# Patient Record
Sex: MALE | Race: ASIAN | NOT HISPANIC OR LATINO
[De-identification: names, ages, dates, MRNs, and addresses within clinical notes are randomized per-mention and may not be internally consistent; named-entity substitution may affect disease eponyms.]

---

## 2024-09-03 PROBLEM — Z00.00 ENCOUNTER FOR PREVENTIVE HEALTH EXAMINATION: Status: ACTIVE | Noted: 2024-09-03

## 2024-09-04 ENCOUNTER — LABORATORY RESULT (OUTPATIENT)
Age: 44
End: 2024-09-04

## 2024-09-04 ENCOUNTER — APPOINTMENT (OUTPATIENT)
Dept: TRANSPLANT | Facility: CLINIC | Age: 44
End: 2024-09-04

## 2024-09-04 ENCOUNTER — APPOINTMENT (OUTPATIENT)
Dept: TRANSPLANT | Facility: CLINIC | Age: 44
End: 2024-09-04
Payer: COMMERCIAL

## 2024-09-04 ENCOUNTER — NON-APPOINTMENT (OUTPATIENT)
Age: 44
End: 2024-09-04

## 2024-09-04 ENCOUNTER — APPOINTMENT (OUTPATIENT)
Dept: HEPATOLOGY | Facility: CLINIC | Age: 44
End: 2024-09-04
Payer: COMMERCIAL

## 2024-09-04 VITALS
WEIGHT: 130 LBS | OXYGEN SATURATION: 100 % | BODY MASS INDEX: 21.66 KG/M2 | HEART RATE: 69 BPM | SYSTOLIC BLOOD PRESSURE: 118 MMHG | RESPIRATION RATE: 16 BRPM | HEIGHT: 65 IN | DIASTOLIC BLOOD PRESSURE: 75 MMHG | TEMPERATURE: 98.1 F

## 2024-09-04 DIAGNOSIS — C22.0 LIVER CELL CARCINOMA: ICD-10-CM

## 2024-09-04 DIAGNOSIS — B18.1 CHRONIC VIRAL HEPATITIS B W/OUT DELTA-AGENT: ICD-10-CM

## 2024-09-04 DIAGNOSIS — Z01.818 ENCOUNTER FOR OTHER PREPROCEDURAL EXAMINATION: ICD-10-CM

## 2024-09-04 DIAGNOSIS — Z01.810 ENCOUNTER FOR PREPROCEDURAL CARDIOVASCULAR EXAMINATION: ICD-10-CM

## 2024-09-04 DIAGNOSIS — K76.9 LIVER DISEASE, UNSPECIFIED: ICD-10-CM

## 2024-09-04 PROCEDURE — 99205 OFFICE O/P NEW HI 60 MIN: CPT

## 2024-09-04 PROCEDURE — 99215 OFFICE O/P EST HI 40 MIN: CPT

## 2024-09-04 NOTE — ASSESSMENT
[FreeTextEntry1] : Patient came in for Initial liver transplant evaluation. Pt met with members of the team: Transplant Surgeon Dr. Dagher, Hepatologist , Transplant Coordinator, , Dietician, Pharmacist,  and ASA. Liver Transplant Education provided via Power Point presentation, overview given of transplant evaluation process, risks/benefits of transplantation, live donor/ donor transplantation,PHS risk/DCD/HCV organ sources, Waitlist management, post transplant care/medication/clinic follow-up. Provided and reviewed educational packet.  All questions answered and team contact information provided. Medications reviewed and updated. Patient reviewed and signed: Informed consent for liver transplant evaluation, HIPPA, [employer], email and HIV form. Patient was advised to inform the transplant coordinator with any changes in health status.  HCV donor acceptance consent - DECLIINED  Required testing/labs reviewed with pt.  Once said testing has been completed, Transplant Coordinator will present to the selection committee  Assessment and Plan:   Pt is 45yo M with HBV newly diagnosed on entecavir, and multifocal HCC (within [place]) here for transplant evaluation.  Pt consented to liver txp evaluation.  The following testing is needed:   Liver Evaluation Labs, ordered HCC surveillance - performed in July and Aug, 2024 - obtain CD of imaging - CTAP w/wo IVC 24 and MRI Abdomen 2024 Present to Tumor board HCC, Chest CT UTD 2024 - no thoracic mets Smoking status, current, Chest CT done, encouraged smoking cessation Cardiac, Risk Factors, age, needs DSE, refer to cards Dr. Castañeda EGD - never done, consider scd with Dr. Valenzuela (pt-preference) COLON - never done, age 44, no fam hx colon ca, consider scd with Dr. Valenzuela (pt-preference) Infectious Disease consult- refer to Dr. Hood HEME/Other - obtain Y90 records (tbs at [facility]) SW recommendations RD recommendations Dental, not needed Donor option, none at this time.

## 2024-09-04 NOTE — HISTORY OF PRESENT ILLNESS
[Hepatitis B Virus] : Hepatitis B Virus [History of HCC] : History of hepatocellular carcinoma [Working] : Working [90: Able to carry normal activity; minor signs or symptoms of disease.] : 90: Able to carry normal activity; minor signs or symptoms of disease.  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 There is also a 7 mm indeterminate lesion in seg 8.  Pt case presented case at [facility] multidisciplinary hepatobiliary conference and pt was referred to IR for y90. No extrathoracic mets per Chest CT.  No Cirrhosis or portal htn. MELD?  [ABO  ?]  BMI 22  Pt accompanied by Brother  BACKGROUND does not know how he contracted HBV, family does not have it How long HBV - noted 2 mos ago, started treatment 8/22/24  [place] 5[place]29/24 after being sponsored by his wife.  During a green card application medical checkup, was found to have elevated liver enzymes.  An MRI Abd performed 7/2/2024 revealed 4 cm heterogeneous enhancing mass in seg 8 highly suspicious for hepatocellular carcinoma (LR 5), 1.2 cm vascular lesion in seg 6 medially somewhat s/f hepatocellular carcinoma (LR 4), 7 mm subcapsular arterial phase enhancing focus in seg 8 which is indeterminate, with vascular perfusion anomaly favored.  ALL NKDA  MEDICATION entecavir  SURG HX None  FAM HX No HBV, liver disease, liver or gastric cancer  SOCIAL HX from 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   In the medial aspect of seg 6 in the R lobe there is a 1.3 cm arterially enhancing lesion on the arterial phase, 4:36. This is not well visualized on the venous phase.   At least 2 subcapsular arterially enhancing foci are demonstrated in segment 8 and 8/5, best demonstrated on image 4:22. This corresponds to the MRI, image 6:17. The larger lesion measures approximately 1.7 cm on the arterial phase. A probable third subcapsular focus is demonstrated on image 4:24. These are not well visualized on the venous phase imaging.   The portal vein is patent and appears normal in caliber.  Gallbladder: Unremarkable.  Bile ducts: No evidence of biliary dilatation  Kidneys/Ureters/Urinary Bladder: The kidneys are normal in size and demonstrate symmetric enhancement. Bilateral tiny hypodense foci are too small to categorize but statistically likely cysts. No hydronephrosis is demonstrated. A tiny nonobstructive right renal lower pole calculus is demonstrated. The bladder is grossly unremarkable.  Spleen: The spleen is normal in size and contour.  Pancreas: Unremarkable.  Adrenal glands: No evidence of adrenal nodule.  Retroperitoneum: Small retroperitoneal lymph nodes are demonstrated, largest short axis 7 mm in a left periaortic location.  Lymph nodes: Small gastrohepatic lymph nodes are demonstrated, maximal short axis dimension 7.5 mm, image 6:25.  Peritoneum: No evidence of ascites  Bowel/Stomach: The bowel is not optimally distended for complete evaluation. No evidence of bowel obstruction.  Appendix: Unremarkable.  Pelvic Organs: The prostate gland is not enlarged. Seminal vesicles are unremarkable. No pelvic fluid is seen.  Musculoskeletal: No destructive lesions are demonstrated. Vascular Structures: No evidence of abdominal aortic aneurysm   IMPRESSION Multiple hepatic lesions with arterial enhancement as described above, the largest in the right lobe measuring 4.5 cm.  Additional lesions, approximately 4-5, are again demonstrated as described above.  Small gastrohepatic and retroperitoneal lymph nodes as described above.  No evidence of ascites.  MRI Abdomen 7/2/2024:  - 4 cm heterogeneous enhancing mass in segment 8 of the liver which is highly suspicious for hepatocellular carcinoma (LR 5) - 1.2 cm vascular lesion in segment 6 of the liver medially which is somewhat suspicious for hepatocellular carcinoma (LR 4) - 7 mm subcapsular arterial phase enhancing focus in segment 8 which is indeterminate, with vascular perfusion anomaly favored.  CT CHEST IVC  8/29/24  FINDINGS:  LUNGS: Unremarkable  PLEURA: No Effusion  MEDIASTINUM: No enlarged thoracic lymph nodes.  CARDIAC: Normal cardiac size  BONE/ SOFT TISSUE: Unremarkable  UPPER ABDOMEN: The upper abdomen is being evaluated with the entire abdominal CT of 8/29/2024.   IMPRESSION Normal CT of the chest  EGD - Never had  COLON - never had, age 44, no fam hx colon ca  INTERVAL HX feels 'normal' denies abdl pain, NVD, CP, SOB, f/c appetite 'good' BM 1x/daily, no rectal blood denies BRITT, ascites, denies hematemesis, denies confusion/trouble sleeping

## 2024-09-04 NOTE — PLAN
[Patient agrees to proceed with the full evaluation for liver transplantation.] : Patient agrees to proceed with the full evaluation for liver transplantation. [FreeTextEntry1] : 44 year old gentleman with HBV and recently found to have HCC, likely multifocal.  Smokes about half pack a day but otherwise in good health.  Imaging reviewed - right lobe lesion with at least two other smaller lesions.  Given age and multifocal nature of his HCC, I think transplant is best chance for long term cure.  Planned for Y90 at [facility] in the meantime.  Will also review imaging at our tumor conference.  Encouraged him to cut back/quit smoking.  He and his brother understand the plan.  Good to see him.

## 2024-09-04 NOTE — END OF VISIT
[FreeTextEntry3] : See narrative above.  [Time Spent: ___ minutes] : I have spent [unfilled] minutes of time on the encounter which excludes teaching and separately reported services. [Attestation] : The patient was explained alternatives, benefits and risk of liver transplantation, including but not limited to infection, bleeding, hepatic artery or portal vein thrombosis, primary dysfunction or primary non-function of the liver allograft, cardiopulmonary arrest, intra-operative death and other surgical, medical and psychosocial risks as outlined in the evaluation consent form.  The patient understands these risks and is willing to proceed with liver transplantation. The patient was also explained the need to remain on lifelong anti-rejection medications.  We discussed the risks and side effects of immunosuppressive medications including, but not limited to infection, cancer, weight gain, new onset or worsening of diabetes or hypertension in a temporary or permanent state, kidney dysfunction, water retention, back pain, constipation, diarrhea, dizziness, headache, joint pain, loss of appetite, nausea, stomach pain or upset, trouble sleeping, vomiting, and mental or mood changes.  An overview of the follow-up protocol was reviewed including outpatient visits, blood tests and the potential for hospital readmission.  The patient understands these risks and is willing to proceed with liver transplantation. We also discussed the available donor organ pool. We discussed the assessment of the  donor including age, cause of death, cardiac arrest, electrolyte abnormalities, course and length of hospital stay, use of vasopressors, hepatitis and HIV testing. We reviewed organ donor risk factors that could affect the success of the graft or the health of the patient, including, but not limited to, the donor's history, condition or age of the organs used, or the patient's potential risk of judith the human immunodeficiency virus and other infectious diseases if the disease cannot be detected in an infected donor.  We discussed and defined the option of an extended criteria for cadaveric donors (Hepatitis B core Ab positive donor, Hepatitis C Ab positive donor, steatosis, older donors, split livers and DCD donors) and early and late outcomes of graft survival after transplantation. The options of  donor liver transplantation vs. live donor liver transplantation were discussed with the patient.  Differences between donation after cardiac death (DCD) compared to donation after brain death (DBD) liver transplantation were also fully disclosed and included lower graft survival rates, the increased incidence of hepatic artery stenosis, bile duct injury, ischemic cholangiopathy and increased re transplant rates seen in recipients of DCD donor livers. The use of the U.S. Public Health Services (PHS) Guideline has defined some donors as "Increased Risk Donors" based on their history which may suggest socially increased risk behaviors was discussed. The patient is aware that if PHS increased risk donor is offered to the candidate, the transplant team will discuss the specifics to assist with making an informed decision. We discussed our post-transplant protocol of serology testing if the candidate receives an organ that is PHS increased risk. The patient was made aware that it is against the law to be paid or to pay to donate an organ.  If any money was given or will be given in exchange for receiving an organ, the patient may be subject to criminal prosecution, and any insurance coverage may no longer apply and patient may become personally responsible for all the health care costs associated with the donation, and private health information will be available to law enforcement agencies. We explained that we store vessels for subsequent later use in transplants.  Again, we discussed the extensive testing done on  donors prior to donation, however despite an extensive evaluation on the donor, there is potential risk a recipient may contract infectious diseases (HIV or Hepatitis) or cancer if they cannot be detected in the donor. In the cases where PHS Increased Risk donor vessels are used, we test the recipient per protocol post-transplant for any potential infectious disease transmission. The patient understands that there is the potential of use of  donor vessels and PHS increased risk donor vessels. The patient understands these risks and is willing to receive potentially PHS increased risk donor vessels. We also discussed the MELD allocation system in depth and the one-year observed and expected patient and graft survival rates according to latest data from the Scientific Registry for Transplant Recipients. These center specific outcomes were provided in comparison to the national one-year averages as described in the evaluation consent forms. Prior to signing consent, the patient was given an opportunity to ask questions.  After all concerns were addressed, informed consent was signed. Patient is aware that they may withdraw their consent for transplantation at any time.  Further, the patient is aware of the right to refuse an organ offer without penalty at any time.

## 2024-09-04 NOTE — HISTORY OF PRESENT ILLNESS
[Hepatitis B Virus] : Hepatitis B Virus [History of HCC] : History of hepatocellular carcinoma [Working] : Working [90: Able to carry normal activity; minor signs or symptoms of disease.] : 90: Able to carry normal activity; minor signs or symptoms of disease.  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 There is also a 7 mm indeterminate lesion in seg 8.  Pt case presented case at [facility] multidisciplinary hepatobiliary conference and pt was referred to IR for y90. No extrathoracic mets per Chest CT.  No Cirrhosis or portal htn. MELD?  [ABO  ?]  BMI 22  Pt accompanied by Brother  BACKGROUND does not know how he contracted HBV, family does not have it How long HBV - noted 2 mos ago, started treatment 8/22/24  [place] after being sponsored by his wife.  During a green card application medical checkup, was found to have elevated liver enzymes.  An MRI Abd performed 7/2/2024 revealed 4 cm heterogeneous enhancing mass in seg 8 highly suspicious for hepatocellular carcinoma (LR 5), 1.2 cm vascular lesion in seg 6 medially somewhat s/f hepatocellular carcinoma (LR 4), 7 mm subcapsular arterial phase enhancing focus in seg 8 which is indeterminate, with vascular perfusion anomaly favored.  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   In the medial aspect of seg 6 in the R lobe there is a 1.3 cm arterially enhancing lesion on the arterial phase, 4:36. This is not well visualized on the venous phase.   At least 2 subcapsular arterially enhancing foci are demonstrated in segment 8 and 8/5, best demonstrated on image 4:22. This corresponds to the MRI, image 6:17. The larger lesion measures approximately 1.7 cm on the arterial phase. A probable third subcapsular focus is demonstrated on image 4:24. These are not well visualized on the venous phase imaging.   The portal vein is patent and appears normal in caliber.  Gallbladder: Unremarkable.  Bile ducts: No evidence of biliary dilatation  Kidneys/Ureters/Urinary Bladder: The kidneys are normal in size and demonstrate symmetric enhancement. Bilateral tiny hypodense foci are too small to categorize but statistically likely cysts. No hydronephrosis is demonstrated. A tiny nonobstructive right renal lower pole calculus is demonstrated. The bladder is grossly unremarkable.  Spleen: The spleen is normal in size and contour.  Pancreas: Unremarkable.  Adrenal glands: No evidence of adrenal nodule.  Retroperitoneum: Small retroperitoneal lymph nodes are demonstrated, largest short axis 7 mm in a left periaortic location.  Lymph nodes: Small gastrohepatic lymph nodes are demonstrated, maximal short axis dimension 7.5 mm, image 6:25.  Peritoneum: No evidence of ascites  Bowel/Stomach: The bowel is not optimally distended for complete evaluation. No evidence of bowel obstruction.  Appendix: Unremarkable.  Pelvic Organs: The prostate gland is not enlarged. Seminal vesicles are unremarkable. No pelvic fluid is seen.  Musculoskeletal: No destructive lesions are demonstrated. Vascular Structures: No evidence of abdominal aortic aneurysm   IMPRESSION Multiple hepatic lesions with arterial enhancement as described above, the largest in the right lobe measuring 4.5 cm.  Additional lesions, approximately 4-5, are again demonstrated as described above.  Small gastrohepatic and retroperitoneal lymph nodes as described above.  No evidence of ascites.  MRI Abdomen 7/2/2024:  - 4 cm heterogeneous enhancing mass in segment 8 of the liver which is highly suspicious for hepatocellular carcinoma (LR 5) - 1.2 cm vascular lesion in segment 6 of the liver medially which is somewhat suspicious for hepatocellular carcinoma (LR 4) - 7 mm subcapsular arterial phase enhancing focus in segment 8 which is indeterminate, with vascular perfusion anomaly favored.  CT CHEST IVC  8/29/24  FINDINGS:  LUNGS: Unremarkable  PLEURA: No Effusion  MEDIASTINUM: No enlarged thoracic lymph nodes.  CARDIAC: Normal cardiac size  BONE/ SOFT TISSUE: Unremarkable  UPPER ABDOMEN: The upper abdomen is being evaluated with the entire abdominal CT of 8/29/2024.   IMPRESSION Normal CT of the chest  EGD - Never had  COLON - never had, age 44, no fam hx colon ca  INTERVAL HX feels 'normal' denies abdl pain, NVD, CP, SOB, f/c appetite 'good' BM 1x/daily, no rectal blood denies BRITT, ascites, denies hematemesis, denies confusion/trouble sleeping

## 2024-09-04 NOTE — REASON FOR VISIT
[Initial] : an initial visit for [Liver Transplant Evaluation] : liver transplant evaluation [FreeTextEntry2] : Dr. Fernando Valenzuela

## 2024-09-04 NOTE — REASON FOR VISIT
[Initial] : an initial visit for [Liver Transplant Evaluation] : liver transplant evaluation [Source: ________] : History obtained from [unfilled] [FreeTextEntry2] : Dr. Fernando Valenzuela

## 2024-09-04 NOTE — ASSESSMENT
[FreeTextEntry1] : Assessment and Plan: Pt is 45yo M with HBV newly diagnosed on entecavir, and multifocal HCC (within [place]) here for transplant evaluation.  Pt consented to liver txp evaluation.  HCV donor acceptance consent - DECLIINED

## 2024-09-04 NOTE — REASON FOR VISIT
Acute GI bleeding Acute GI bleeding Acute GI bleeding UGIB (upper gastrointestinal bleed) [Initial] : an initial visit for [Liver Transplant Evaluation] : liver transplant evaluation [Source: ________] : History obtained from [unfilled] [FreeTextEntry2] : Dr. Fernando Valenzuela Acute GI bleeding Acute GI bleeding

## 2024-09-04 NOTE — HISTORY OF PRESENT ILLNESS
[90: Able to carry normal activity; minor signs or symptoms of disease.] : 90: Able to carry normal activity; minor signs or symptoms of disease.  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    43 yo Mandarin-speaking M, current smoker w chronic HBV on entecavir and recently discovered HCC with 2 liver lesions - seg 8 (4 cm) LR-5, seg 6 (1.2 cm) LR-4 associated with elevated AFP. There is also a 7 mm indeterminate lesion in seg 8. Pt case presented case at [facility] multidisciplinary hepatobiliary conference and pt was referred to IR for y90. No extrathoracic mets per Chest CT. No Cirrhosis or portal htn. MELD? [ABO ?] BMI 22  BACKGROUND does not know how he contracted HBV, family does not have it How long HBV - noted 2 mos ago, started treatment 8/22/24  [place] 5[place] after being sponsored by his wife. During a green card application medical checkup, was found to have elevated liver enzymes. An MRI Abd performed 7/2/2024 revealed 4 cm heterogeneous enhancing mass in seg 8 highly suspicious for hepatocellular carcinoma (LR 5), 1.2 cm vascular lesion in seg 6 medially somewhat s/f hepatocellular carcinoma (LR 4), 7 mm subcapsular arterial phase enhancing focus in seg 8 which is indeterminate, with vascular perfusion anomaly favored.  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  In the medial aspect of seg 6 in the R lobe there is a 1.3 cm arterially enhancing lesion on the arterial phase, 4:36. This is not well visualized on the venous phase.  At least 2 subcapsular arterially enhancing foci are demonstrated in segment 8 and 8/5, best demonstrated on image 4:22. This corresponds to the MRI, image 6:17. The larger lesion measures approximately 1.7 cm on the arterial phase. A probable third subcapsular focus is demonstrated on image 4:24. These are not well visualized on the venous phase imaging.  The portal vein is patent and appears normal in caliber. Gallbladder: Unremarkable. Bile ducts: No evidence of biliary dilatation Kidneys/Ureters/Urinary Bladder: The kidneys are normal in size and demonstrate symmetric enhancement. Bilateral tiny hypodense foci are too small to categorize but statistically likely cysts. No hydronephrosis is demonstrated. A tiny nonobstructive right renal lower pole calculus is demonstrated. The bladder is grossly unremarkable. Spleen: The spleen is normal in size and contour. Pancreas: Unremarkable. Adrenal glands: No evidence of adrenal nodule. Retroperitoneum: Small retroperitoneal lymph nodes are demonstrated, largest short axis 7 mm in a left periaortic location. Lymph nodes: Small gastrohepatic lymph nodes are demonstrated, maximal short axis dimension 7.5 mm, image 6:25. Peritoneum: No evidence of ascites Bowel/Stomach: The bowel is not optimally distended for complete evaluation. No evidence of bowel obstruction. Appendix: Unremarkable. Pelvic Organs: The prostate gland is not enlarged. Seminal vesicles are unremarkable. No pelvic fluid is seen. Musculoskeletal: No destructive lesions are demonstrated. Vascular Structures: No evidence of abdominal aortic aneurysm  IMPRESSION Multiple hepatic lesions with arterial enhancement as described above, the largest in the right lobe measuring 4.5 cm. Additional lesions, approximately 4-5, are again demonstrated as described above. Small gastrohepatic and retroperitoneal lymph nodes as described above. No evidence of ascites.  MRI Abdomen 7/2/2024: - 4 cm heterogeneous enhancing mass in segment 8 of the liver which is highly suspicious for hepatocellular carcinoma (LR 5) - 1.2 cm vascular lesion in segment 6 of the liver medially which is somewhat suspicious for hepatocellular carcinoma (LR 4) - 7 mm subcapsular arterial phase enhancing focus in segment 8 which is indeterminate, with vascular perfusion anomaly favored.  CT CHEST IVC 8/29/24 FINDINGS: LUNGS: Unremarkable PLEURA: No Effusion MEDIASTINUM: No enlarged thoracic lymph nodes. CARDIAC: Normal cardiac size BONE/ SOFT TISSUE: Unremarkable UPPER ABDOMEN: The upper abdomen is being evaluated with the entire abdominal CT of 8/29/2024.  IMPRESSION Normal CT of the chest  EGD - Never had  COLON - never had, age 44, no fam hx colon ca  INTERVAL HX feels 'normal' denies abdl pain, NVD, CP, SOB, f/c appetite 'good' BM 1x/daily, no rectal blood denies BRITT, ascites, denies hematemesis, denies confusion/trouble sleeping

## 2024-09-04 NOTE — ASSESSMENT
[FreeTextEntry1] : Patient came in for Initial liver transplant evaluation. Pt met with members of the team: Transplant Surgeon Dr. Dagher, Hepatologist , Transplant Coordinator, , Dietician, Pharmacist,  and ASA. Liver Transplant Education provided via Power Point presentation, overview given of transplant evaluation process, risks/benefits of transplantation, live donor/ donor transplantation,PHS risk/DCD/HCV organ sources, Waitlist management, post transplant care/medication/clinic follow-up. Provided and reviewed educational packet.  All questions answered and team contact information provided. Medications reviewed and updated. Patient reviewed and signed: Informed consent for liver transplant evaluation, HIPPA, [employer], email and HIV form. Patient was advised to inform the transplant coordinator with any changes in health status.  HCV donor acceptance consent - DECLIINED  Required testing/labs reviewed with pt.  Once said testing has been completed, Transplant Coordinator will present to the selection committee  Assessment and Plan:   Pt is 43yo M with HBV newly diagnosed on entecavir, and multifocal HCC (within [place]) here for transplant evaluation.  Pt consented to liver txp evaluation.  The following testing is needed:   Liver Evaluation Labs, ordered HCC surveillance - performed in July and Aug, 2024 - obtain CD of imaging - CTAP w/wo IVC 24 and MRI Abdomen 2024 Present to Tumor board HCC, Chest CT UTD 2024 - no thoracic mets Smoking status, current, Chest CT done, encouraged smoking cessation Cardiac, Risk Factors, age, needs DSE, refer to cards Dr. Castañeda EGD - never done, consider scd with Dr. Valenzuela (pt-preference) COLON - never done, age 44, no fam hx colon ca, consider scd with Dr. Valenzuela (pt-preference) Infectious Disease consult- refer to Dr. Hood HEME/Other - obtain Y90 records (tbs at [facility]) SW recommendations RD recommendations Dental, not needed Donor option, none at this time.

## 2024-09-06 PROBLEM — B18.1 CHRONIC HEPATITIS B: Status: ACTIVE | Noted: 2024-09-04

## 2024-09-06 PROBLEM — Z01.818 PRE-TRANSPLANT EVALUATION FOR LIVER TRANSPLANT: Status: ACTIVE | Noted: 2024-09-04

## 2024-09-06 PROBLEM — K76.9 LIVER LESION: Status: ACTIVE | Noted: 2024-09-04

## 2024-09-06 LAB
ABO + RH PNL BLD: NORMAL
AFP-TM SERPL-MCNC: 198 NG/ML
ALBUMIN SERPL ELPH-MCNC: 4.2 G/DL
ALP BLD-CCNC: 117 U/L
ALT SERPL-CCNC: 35 U/L
AMMONIA PLAS-MCNC: 26.7 UMOL/L
ANION GAP SERPL CALC-SCNC: 12 MMOL/L
APPEARANCE: CLEAR
AST SERPL-CCNC: 30 U/L
BACTERIA: NEGATIVE /HPF
BILIRUB SERPL-MCNC: 0.5 MG/DL
BILIRUBIN URINE: NEGATIVE
BLOOD URINE: NEGATIVE
BUN SERPL-MCNC: 11 MG/DL
CALCIUM SERPL-MCNC: 9.3 MG/DL
CAST: 0 /LPF
CHLORIDE SERPL-SCNC: 104 MMOL/L
CHOLEST SERPL-MCNC: 165 MG/DL
CMV IGG SERPL QL: 5.2 U/ML
CMV IGG SERPL-IMP: POSITIVE
CO2 SERPL-SCNC: 23 MMOL/L
COLOR: YELLOW
COVID-19 SPIKE DOMAIN ANTIBODY INTERPRETATION: POSITIVE
CREAT SERPL-MCNC: 0.82 MG/DL
CREAT SPEC-SCNC: 135 MG/DL
CREAT/PROT UR: 0.1 RATIO
EBV DNA SERPL NAA+PROBE-ACNC: NOT DETECTED IU/ML
EBV EA AB SER IA-ACNC: 28.2 U/ML
EBV EA AB TITR SER IF: POSITIVE
EBV EA IGG SER QL IA: >600 U/ML
EBV EA IGG SER-ACNC: POSITIVE
EBV EA IGM SER IA-ACNC: NEGATIVE
EBV PATRN SPEC IB-IMP: NORMAL
EBV VCA IGG SER IA-ACNC: 219 U/ML
EBV VCA IGM SER QL IA: 13.3 U/ML
EBVPCR LOG: NOT DETECTED LOG10IU/ML
EGFR: 112 ML/MIN/1.73M2
EPITHELIAL CELLS: 0 /HPF
EPSTEIN-BARR VIRUS CAPSID ANTIGEN IGG: POSITIVE
ESTIMATED AVERAGE GLUCOSE: 103 MG/DL
ETHANOL BLD-MCNC: <10 MG/DL
GLUCOSE QUALITATIVE U: NEGATIVE MG/DL
GLUCOSE SERPL-MCNC: 77 MG/DL
HAV IGM SER QL: NONREACTIVE
HBA1C MFR BLD HPLC: 5.2 %
HBV CORE IGG+IGM SER QL: REACTIVE
HBV SURFACE AB SER QL: NONREACTIVE
HBV SURFACE AB SERPL IA-ACNC: <3 MIU/ML
HBV SURFACE AG SER QL: REACTIVE
HCV AB SER QL: NONREACTIVE
HCV S/CO RATIO: 0.18 S/CO
HDLC SERPL-MCNC: 63 MG/DL
HEPATITIS A IGG ANTIBODY: REACTIVE
HEPATITIS A IGG ANTIBODY: REACTIVE
HIV1+2 AB SPEC QL IA.RAPID: NONREACTIVE
HSV 1+2 IGG SER IA-IMP: NEGATIVE
HSV 1+2 IGG SER IA-IMP: POSITIVE
HSV1 IGG SER QL: >62.2 INDEX
HSV2 IGG SER QL: 0.07 INDEX
INR PPP: 1.15 RATIO
KETONES URINE: 15 MG/DL
LDLC SERPL CALC-MCNC: 92 MG/DL
LEUKOCYTE ESTERASE URINE: NEGATIVE
MAGNESIUM SERPL-MCNC: 2 MG/DL
MEV IGG FLD QL IA: 21.5 AU/ML
MEV IGG+IGM SER-IMP: POSITIVE
MICROSCOPIC-UA: NORMAL
MUV AB SER-ACNC: POSITIVE
MUV IGG SER QL IA: 119 AU/ML
NITRITE URINE: NEGATIVE
NONHDLC SERPL-MCNC: 102 MG/DL
PH URINE: 6.5
PHOSPHATE SERPL-MCNC: 3.2 MG/DL
POTASSIUM SERPL-SCNC: 4.1 MMOL/L
PROT SERPL-MCNC: 7.6 G/DL
PROT UR-MCNC: 8 MG/DL
PROTEIN URINE: NEGATIVE MG/DL
PSA SERPL-MCNC: 1.38 NG/ML
PT BLD: 12.9 SEC
RED BLOOD CELLS URINE: 1 /HPF
RUBV IGG FLD-ACNC: 8.1 INDEX
RUBV IGG FLD-ACNC: 8.1 INDEX
RUBV IGG SER-IMP: POSITIVE
RUBV IGG SER-IMP: POSITIVE
SARS-COV-2 AB SERPL IA-ACNC: >250 U/ML
SODIUM SERPL-SCNC: 139 MMOL/L
SPECIFIC GRAVITY URINE: 1.02
T GONDII AB SER-IMP: NEGATIVE
T GONDII IGG SER QL: <3 IU/ML
T PALLIDUM AB SER QL IA: NEGATIVE
TRIGL SERPL-MCNC: 47 MG/DL
UROBILINOGEN URINE: 1 MG/DL
VZV AB TITR SER: POSITIVE
VZV IGG SER IF-ACNC: 768.6 INDEX
WHITE BLOOD CELLS URINE: 0 /HPF

## 2024-09-06 NOTE — HISTORY OF PRESENT ILLNESS
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   45 yo Mandarin-speaking M, current smoker w chronic HBV on entecavir and recently discovered HCC with 2 liver lesions - seg 8 (4 cm) LR-5, seg 6 (1.2 cm) LR-4 associated with elevated AFP. There is also a 7 mm indeterminate lesion in seg 8. Pt case presented case at [facility] multidisciplinary hepatobiliary conference and pt was referred to IR for y90. No extrathoracic mets per Chest CT. No Cirrhosis or portal htn. MELD? [ABO ?] BMI 22  BACKGROUND does not know how he contracted HBV, family does not have it How long HBV - noted 2 mos ago, started treatment 8/22/24  [place] after being sponsored by his wife. During a green card application medical checkup, was found to have elevated liver enzymes. An MRI Abd performed 7/2/2024 revealed 4 cm heterogeneous enhancing mass in seg 8 highly suspicious for hepatocellular carcinoma (LR 5), 1.2 cm vascular lesion in seg 6 medially somewhat s/f hepatocellular carcinoma (LR 4), 7 mm subcapsular arterial phase enhancing focus in seg 8 which is indeterminate, with vascular perfusion anomaly favored.  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  In the medial aspect of seg 6 in the R lobe there is a 1.3 cm arterially enhancing lesion on the arterial phase, 4:36. This is not well visualized on the venous phase.  At least 2 subcapsular arterially enhancing foci are demonstrated in segment 8 and 8/5, best demonstrated on image 4:22. This corresponds to the MRI, image 6:17. The larger lesion measures approximately 1.7 cm on the arterial phase. A probable third subcapsular focus is demonstrated on image 4:24. These are not well visualized on the venous phase imaging.  The portal vein is patent and appears normal in caliber. Gallbladder: Unremarkable. Bile ducts: No evidence of biliary dilatation Kidneys/Ureters/Urinary Bladder: The kidneys are normal in size and demonstrate symmetric enhancement. Bilateral tiny hypodense foci are too small to categorize but statistically likely cysts. No hydronephrosis is demonstrated. A tiny nonobstructive right renal lower pole calculus is demonstrated. The bladder is grossly unremarkable. Spleen: The spleen is normal in size and contour. Pancreas: Unremarkable. Adrenal glands: No evidence of adrenal nodule. Retroperitoneum: Small retroperitoneal lymph nodes are demonstrated, largest short axis 7 mm in a left periaortic location. Lymph nodes: Small gastrohepatic lymph nodes are demonstrated, maximal short axis dimension 7.5 mm, image 6:25. Peritoneum: No evidence of ascites Bowel/Stomach: The bowel is not optimally distended for complete evaluation. No evidence of bowel obstruction. Appendix: Unremarkable. Pelvic Organs: The prostate gland is not enlarged. Seminal vesicles are unremarkable. No pelvic fluid is seen. Musculoskeletal: No destructive lesions are demonstrated. Vascular Structures: No evidence of abdominal aortic aneurysm  IMPRESSION Multiple hepatic lesions with arterial enhancement as described above, the largest in the right lobe measuring 4.5 cm. Additional lesions, approximately 4-5, are again demonstrated as described above. Small gastrohepatic and retroperitoneal lymph nodes as described above. No evidence of ascites.  MRI Abdomen 7/2/2024: - 4 cm heterogeneous enhancing mass in segment 8 of the liver which is highly suspicious for hepatocellular carcinoma (LR 5) - 1.2 cm vascular lesion in segment 6 of the liver medially which is somewhat suspicious for hepatocellular carcinoma (LR 4) - 7 mm subcapsular arterial phase enhancing focus in segment 8 which is indeterminate, with vascular perfusion anomaly favored.  CT CHEST IVC 8/29/24 FINDINGS: LUNGS: Unremarkable PLEURA: No Effusion MEDIASTINUM: No enlarged thoracic lymph nodes. CARDIAC: Normal cardiac size BONE/ SOFT TISSUE: Unremarkable UPPER ABDOMEN: The upper abdomen is being evaluated with the entire abdominal CT of 8/29/2024.  IMPRESSION Normal CT of the chest  EGD - Never had  COLON - never had, age 44, no fam hx colon ca  INTERVAL HX feels 'normal' denies abdl pain, NVD, CP, SOB, f/c appetite 'good' BM 1x/daily, no rectal blood denies BRITT, ascites, denies hematemesis, denies confusion/trouble sleeping  [FreeTextEntry1] : Patient feels okay and does not have any major liver or GI related complaint No nausea vomiting or diarrhea No history of GI bleeding or hepatic decompensation No chest pain or shortness of breath  Vital signs are stable Clear lungs with no crackle or rhonchi No lymphadenopathy Soft abdomen with no tenderness, ascites or hernia No edema of extremities Awake alert and oriented

## 2024-09-06 NOTE — ASSESSMENT
[FreeTextEntry1] : Chronic hepatitis B with hepatocellular carcinoma Patient has been discussed at [facility] and will undergo treatment there. Considering the size of the lesion, alpha-fetoprotein of 190, she was small at-risk lesions in other parts of the liver, he would be a transplant candidate.  Treatment options were discussed with him.  Natural history of hepatitis B, universal precautions, need for screening and vaccination of family members if they are not infected, risk of untreated treatment, and potential risks associated with medications were discussed with him and his family in details.  I had a long discussion with patient and family and following topics were discussed Indication for liver transplant Overall review of the procedure Listing criteria MELD and organ allocation Need for compliance with care including visits, taking medications and avoiding alcohol and illicit substances  Options of living donor liver transplant, multiple listing, DCD, PHS risk organs as potential ways of improving wait time Potential risks associated with liver transplant   Quality measures medication and food interaction Diet Exercise   Patient is a good candidate for liver transplant.

## 2024-09-06 NOTE — ASSESSMENT
[FreeTextEntry1] : Chronic hepatitis B with hepatocellular carcinoma Patient has been discussed at [facility] and will undergo treatment there. Considering the size of the lesion, alpha-fetoprotein of 190, she was small at-risk lesions in other parts of the liver, he would be a transplant candidate.  Treatment options were discussed with him.  Natural history of hepatitis B, universal precautions, need for screening and vaccination of family members if they are not infected, risk of untreated treatment, and potential risks associated with medications were discussed with him and his family in details.  I had a long discussion with patient and family and following topics were discussed Indication for liver transplant Overall review of the procedure Listing criteria MELD and organ allocation Need for compliance with care including visits, taking medications and avoiding alcohol and illicit substances  Options of living donor liver transplant, multiple listing, DCD, PHS risk organs as potential ways of improving wait time Potential risks associated with liver transplant   Quality measures medication and food interaction Diet Exercise   Patient is a good candidate for liver transplant.

## 2024-09-06 NOTE — HISTORY OF PRESENT ILLNESS
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   45 yo Mandarin-speaking M, current smoker w chronic HBV on entecavir and recently discovered HCC with 2 liver lesions - seg 8 (4 cm) LR-5, seg 6 (1.2 cm) LR-4 associated with elevated AFP. There is also a 7 mm indeterminate lesion in seg 8. Pt case presented case at [facility] multidisciplinary hepatobiliary conference and pt was referred to IR for y90. No extrathoracic mets per Chest CT. No Cirrhosis or portal htn. MELD? [ABO ?] BMI 22  BACKGROUND does not know how he contracted HBV, family does not have it How long HBV - noted 2 mos ago, started treatment 8/22/24  [place] after being [place] by his wife. During a green card application medical checkup, was found to have elevated liver enzymes. An MRI Abd performed 7/2/2024 revealed 4 cm heterogeneous enhancing mass in seg 8 highly suspicious for hepatocellular carcinoma (LR 5), 1.2 cm vascular lesion in seg 6 medially somewhat s/f hepatocellular carcinoma (LR 4), 7 mm subcapsular arterial phase enhancing focus in seg 8 which is indeterminate, with vascular perfusion anomaly favored.  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  In the medial aspect of seg 6 in the R lobe there is a 1.3 cm arterially enhancing lesion on the arterial phase, 4:36. This is not well visualized on the venous phase.  At least 2 subcapsular arterially enhancing foci are demonstrated in segment 8 and 8/5, best demonstrated on image 4:22. This corresponds to the MRI, image 6:17. The larger lesion measures approximately 1.7 cm on the arterial phase. A probable third subcapsular focus is demonstrated on image 4:24. These are not well visualized on the venous phase imaging.  The portal vein is patent and appears normal in caliber. Gallbladder: Unremarkable. Bile ducts: No evidence of biliary dilatation Kidneys/Ureters/Urinary Bladder: The kidneys are normal in size and demonstrate symmetric enhancement. Bilateral tiny hypodense foci are too small to categorize but statistically likely cysts. No hydronephrosis is demonstrated. A tiny nonobstructive right renal lower pole calculus is demonstrated. The bladder is grossly unremarkable. Spleen: The spleen is normal in size and contour. Pancreas: Unremarkable. Adrenal glands: No evidence of adrenal nodule. Retroperitoneum: Small retroperitoneal lymph nodes are demonstrated, largest short axis 7 mm in a left periaortic location. Lymph nodes: Small gastrohepatic lymph nodes are demonstrated, maximal short axis dimension 7.5 mm, image 6:25. Peritoneum: No evidence of ascites Bowel/Stomach: The bowel is not optimally distended for complete evaluation. No evidence of bowel obstruction. Appendix: Unremarkable. Pelvic Organs: The prostate gland is not enlarged. Seminal vesicles are unremarkable. No pelvic fluid is seen. Musculoskeletal: No destructive lesions are demonstrated. Vascular Structures: No evidence of abdominal aortic aneurysm  IMPRESSION Multiple hepatic lesions with arterial enhancement as described above, the largest in the right lobe measuring 4.5 cm. Additional lesions, approximately 4-5, are again demonstrated as described above. Small gastrohepatic and retroperitoneal lymph nodes as described above. No evidence of ascites.  MRI Abdomen 7/2/2024: - 4 cm heterogeneous enhancing mass in segment 8 of the liver which is highly suspicious for hepatocellular carcinoma (LR 5) - 1.2 cm vascular lesion in segment 6 of the liver medially which is somewhat suspicious for hepatocellular carcinoma (LR 4) - 7 mm subcapsular arterial phase enhancing focus in segment 8 which is indeterminate, with vascular perfusion anomaly favored.  CT CHEST IVC 8/29/24 FINDINGS: LUNGS: Unremarkable PLEURA: No Effusion MEDIASTINUM: No enlarged thoracic lymph nodes. CARDIAC: Normal cardiac size BONE/ SOFT TISSUE: Unremarkable UPPER ABDOMEN: The upper abdomen is being evaluated with the entire abdominal CT of 8/29/2024.  IMPRESSION Normal CT of the chest  EGD - Never had  COLON - never had, age 44, no fam hx colon ca  INTERVAL HX feels 'normal' denies abdl pain, NVD, CP, SOB, f/c appetite 'good' BM 1x/daily, no rectal blood denies BRITT, ascites, denies hematemesis, denies confusion/trouble sleeping  [FreeTextEntry1] : Patient feels okay and does not have any major liver or GI related complaint No nausea vomiting or diarrhea No history of GI bleeding or hepatic decompensation No chest pain or shortness of breath  Vital signs are stable Clear lungs with no crackle or rhonchi No lymphadenopathy Soft abdomen with no tenderness, ascites or hernia No edema of extremities Awake alert and oriented

## 2024-09-09 LAB
DRUG ABUSE PANEL-9, SERUM: NORMAL
M TB IFN-G BLD-IMP: NEGATIVE
QUANTIFERON TB PLUS MITOGEN MINUS NIL: 6.23 IU/ML
QUANTIFERON TB PLUS NIL: 0.02 IU/ML
QUANTIFERON TB PLUS TB1 MINUS NIL: 0.24 IU/ML
QUANTIFERON TB PLUS TB2 MINUS NIL: 0.17 IU/ML

## 2024-09-12 ENCOUNTER — NON-APPOINTMENT (OUTPATIENT)
Age: 44
End: 2024-09-12

## 2024-09-12 PROBLEM — C22.0 HEPATOMA: Status: ACTIVE | Noted: 2024-09-12

## 2024-09-13 ENCOUNTER — NON-APPOINTMENT (OUTPATIENT)
Age: 44
End: 2024-09-13

## 2024-09-13 LAB
PETH 16:0/18:1: 35 NG/ML
PETH 16:0/18:2: 46 NG/ML
PETH COMMENTS: NORMAL
STRONGYLOIDES AB SER IA-ACNC: NEGATIVE

## 2024-09-23 ENCOUNTER — APPOINTMENT (OUTPATIENT)
Dept: INFECTIOUS DISEASE | Facility: CLINIC | Age: 44
End: 2024-09-23
Payer: MEDICAID

## 2024-09-23 VITALS
TEMPERATURE: 97.9 F | HEIGHT: 65 IN | OXYGEN SATURATION: 99 % | HEART RATE: 71 BPM | WEIGHT: 130 LBS | BODY MASS INDEX: 21.66 KG/M2 | SYSTOLIC BLOOD PRESSURE: 116 MMHG | DIASTOLIC BLOOD PRESSURE: 73 MMHG

## 2024-09-23 DIAGNOSIS — F17.200 NICOTINE DEPENDENCE, UNSPECIFIED, UNCOMPLICATED: ICD-10-CM

## 2024-09-23 DIAGNOSIS — Z60.2 PROBLEMS RELATED TO LIVING ALONE: ICD-10-CM

## 2024-09-23 DIAGNOSIS — Z78.9 OTHER SPECIFIED HEALTH STATUS: ICD-10-CM

## 2024-09-23 DIAGNOSIS — Z86.19 PERSONAL HISTORY OF OTHER INFECTIOUS AND PARASITIC DISEASES: ICD-10-CM

## 2024-09-23 DIAGNOSIS — Z85.05 PERSONAL HISTORY OF MALIGNANT NEOPLASM OF LIVER: ICD-10-CM

## 2024-09-23 DIAGNOSIS — Z63.5 DISRUPTION OF FAMILY BY SEPARATION AND DIVORCE: ICD-10-CM

## 2024-09-23 DIAGNOSIS — K76.9 LIVER DISEASE, UNSPECIFIED: ICD-10-CM

## 2024-09-23 DIAGNOSIS — Z01.818 ENCOUNTER FOR OTHER PREPROCEDURAL EXAMINATION: ICD-10-CM

## 2024-09-23 DIAGNOSIS — Z87.11 PERSONAL HISTORY OF PEPTIC ULCER DISEASE: ICD-10-CM

## 2024-09-23 DIAGNOSIS — B18.1 CHRONIC VIRAL HEPATITIS B W/OUT DELTA-AGENT: ICD-10-CM

## 2024-09-23 PROCEDURE — 99204 OFFICE O/P NEW MOD 45 MIN: CPT

## 2024-09-23 RX ORDER — PANTOPRAZOLE 40 MG/1
40 TABLET, DELAYED RELEASE ORAL
Refills: 0 | Status: ACTIVE | COMMUNITY

## 2024-09-23 RX ORDER — ENTECAVIR 0.5 MG/1
0.5 TABLET, FILM COATED ORAL
Refills: 0 | Status: ACTIVE | COMMUNITY

## 2024-09-23 SDOH — SOCIAL STABILITY - SOCIAL INSECURITY: PROBLEMS RELATED TO LIVING ALONE: Z60.2

## 2024-09-23 SDOH — SOCIAL STABILITY - SOCIAL INSECURITY: DISRUPTION OF FAMILY BY SEPARATION AND DIVORCE: Z63.5

## 2024-09-23 NOTE — HISTORY OF PRESENT ILLNESS
[Women] : with women [Female ___] : [unfilled] female [FreeTextEntry1] : 44M w HBV on Entecavir and HCC. [facility]  [facility]  ID# [id number]. Here for pretransplant ID andrew. Recently seen by LTx team.  Sept 2024 labs - HIV neg, QTB neg, Strongy Ab neg, HBsAg pos, HBcAb pos, EBV/CMV +, Toxo neg, HCV neg, HAV immune, Syphilis screen neg.  Prior labs reviewed - HBV DNA detected.  Denies somatic complaints today.   TB Exposures: Born in [place], recently immigrated to the [place] within the last year. 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  No h/o homelessness/incarceration, no h/o volunteer work at senior care/shelter, no h/o HCW. No known FHx of TB, no known TB exposures.  No prior h/o Rx for LTBI.  QTB neg Sep 2024.     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

## 2024-09-23 NOTE — HISTORY OF PRESENT ILLNESS
[Women] : with women [Female ___] : [unfilled] female [FreeTextEntry1] : 44M w HBV on Entecavir and HCC. [facility]  Gisela  ID# [id number]. Here for pretransplant ID andrew. Recently seen by LTx team.  Sept 2024 labs - HIV neg, QTB neg, Strongy Ab neg, HBsAg pos, HBcAb pos, EBV/CMV +, Toxo neg, HCV neg, HAV immune, Syphilis screen neg.  Prior labs reviewed - HBV DNA detected.  Denies somatic complaints today.   TB Exposures: Born in [place], recently immigrated to the [place] within the last year. 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  No h/o homelessness/incarceration, no h/o volunteer work at longterm/shelter, no h/o HCW. No known FHx of TB, no known TB exposures.  No prior h/o Rx for LTBI.  QTB neg Sep 2024.     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

## 2024-09-23 NOTE — PHYSICAL EXAM
[General Appearance - Alert] : alert [General Appearance - In No Acute Distress] : in no acute distress [PERRL With Normal Accommodation] : pupils were equal in size, round, reactive to light [Sclera] : the sclera and conjunctiva were normal [Extraocular Movements] : extraocular movements were intact [Outer Ear] : the ears and nose were normal in appearance [Hearing Threshold Finger Rub Not [place]] : hearing was normal [Examination Of The Oral Cavity] : the lips and gums were normal [Neck Appearance] : the appearance of the neck was normal [Respiration, Rhythm And Depth] : normal respiratory rhythm and effort [Exaggerated Use Of Accessory Muscles For Inspiration] : no accessory muscle use [Auscultation Breath Sounds / Voice Sounds] : lungs were clear to auscultation bilaterally [Heart Rate And Rhythm] : heart rate was normal and rhythm regular [Heart Sounds] : normal S1 and S2 [Murmurs] : no murmurs [Edema] : there was no peripheral edema [Bowel Sounds] : normal bowel sounds [Abdomen Soft] : soft [Abdomen Tenderness] : non-tender [No Palpable Adenopathy] : no palpable adenopathy [Nail Clubbing] : no clubbing  or cyanosis of the fingernails [Skin Color & Pigmentation] : normal skin color and pigmentation [] : no rash [Cranial Nerves] : cranial nerves 2-12 were intact [No Focal Deficits] : no focal deficits [Oriented To Time, Place, And Person] : oriented to person, place, and time [Skin Lesions] : no skin lesions [Affect] : the affect was normal

## 2024-09-23 NOTE — PHYSICAL EXAM
[General Appearance - Alert] : alert [General Appearance - In No Acute Distress] : in no acute distress [Sclera] : the sclera and conjunctiva were normal [PERRL With Normal Accommodation] : pupils were equal in size, round, reactive to light [Extraocular Movements] : extraocular movements were intact [Outer Ear] : the ears and nose were normal in appearance [Hearing Threshold Finger Rub Not [place]] : hearing was normal [Examination Of The Oral Cavity] : the lips and gums were normal [Neck Appearance] : the appearance of the neck was normal [Respiration, Rhythm And Depth] : normal respiratory rhythm and effort [Exaggerated Use Of Accessory Muscles For Inspiration] : no accessory muscle use [Auscultation Breath Sounds / Voice Sounds] : lungs were clear to auscultation bilaterally [Heart Rate And Rhythm] : heart rate was normal and rhythm regular [Heart Sounds] : normal S1 and S2 [Murmurs] : no murmurs [Edema] : there was no peripheral edema [Bowel Sounds] : normal bowel sounds [Abdomen Soft] : soft [Abdomen Tenderness] : non-tender [No Palpable Adenopathy] : no palpable adenopathy [Nail Clubbing] : no clubbing  or cyanosis of the fingernails [Skin Color & Pigmentation] : normal skin color and pigmentation [] : no rash [Cranial Nerves] : cranial nerves 2-12 were intact [No Focal Deficits] : no focal deficits [Oriented To Time, Place, And Person] : oriented to person, place, and time [Skin Lesions] : no skin lesions [Affect] : the affect was normal

## 2024-09-23 NOTE — ASSESSMENT
[FreeTextEntry1] : HBV, HCC here for pretransplant ID w/u - Check CBC w diff, CMP, HBV DNA PCR, HBeAg, HBeAb - Cont Entecavir Sept 2024 labs - HIV neg, QTB neg, Strongy Ab neg, HBsAg pos, HBcAb pos, EBV/CMV +, Toxo neg, HCV neg, HAV immune, Syphilis screen neg. Prior labs reviewed - HBV DNA detected. - Lab resulted reviewed and d/w pt - F/u 1 mo   [Treatment Education] : treatment education [Treatment Adherence] : treatment adherence [Rx Dose / Side Effects] : Rx dose/side effects [Drug Interactions / Side Effects] : drug interactions/side effects [Anticipatory Guidance] : anticipatory guidance

## 2024-09-24 LAB
BASOPHILS # BLD AUTO: 0.06 K/UL
BASOPHILS NFR BLD AUTO: 1.2 %
EOSINOPHIL # BLD AUTO: 0.09 K/UL
EOSINOPHIL NFR BLD AUTO: 1.7 %
HBV DNA # SERPL NAA+PROBE: 229 IU/ML
HCT VFR BLD CALC: 34.1 %
HEPB DNA PCR INT: DETECTED
HEPB DNA PCR LOG: 2.36 LOGIU/ML
HGB BLD-MCNC: 10.4 G/DL
IMM GRANULOCYTES NFR BLD AUTO: 0.2 %
LYMPHOCYTES # BLD AUTO: 1.57 K/UL
LYMPHOCYTES NFR BLD AUTO: 30.2 %
MAN DIFF?: NORMAL
MCHC RBC-ENTMCNC: 24.3 PG
MCHC RBC-ENTMCNC: 30.5 GM/DL
MCV RBC AUTO: 79.7 FL
MONOCYTES # BLD AUTO: 0.46 K/UL
MONOCYTES NFR BLD AUTO: 8.8 %
NEUTROPHILS # BLD AUTO: 3.01 K/UL
NEUTROPHILS NFR BLD AUTO: 57.9 %
PLATELET # BLD AUTO: 256 K/UL
RBC # BLD: 4.28 M/UL
RBC # FLD: 19.8 %
WBC # FLD AUTO: 5.2 K/UL

## 2024-09-25 LAB
ALBUMIN SERPL ELPH-MCNC: 4.3 G/DL
ALP BLD-CCNC: 110 U/L
ALT SERPL-CCNC: 20 U/L
ANION GAP SERPL CALC-SCNC: 14 MMOL/L
AST SERPL-CCNC: 21 U/L
BILIRUB SERPL-MCNC: 0.3 MG/DL
BUN SERPL-MCNC: 13 MG/DL
CALCIUM SERPL-MCNC: 9.3 MG/DL
CHLORIDE SERPL-SCNC: 103 MMOL/L
CO2 SERPL-SCNC: 25 MMOL/L
CREAT SERPL-MCNC: 0.93 MG/DL
EGFR: 105 ML/MIN/1.73M2
GLUCOSE SERPL-MCNC: 83 MG/DL
POTASSIUM SERPL-SCNC: 4.9 MMOL/L
PROT SERPL-MCNC: 8 G/DL
SODIUM SERPL-SCNC: 142 MMOL/L

## 2024-09-27 LAB
HBV E AB SER QL: REACTIVE
HBV E AG SER QL: NONREACTIVE

## 2024-10-04 ENCOUNTER — OUTPATIENT (OUTPATIENT)
Dept: OUTPATIENT SERVICES | Facility: HOSPITAL | Age: 44
LOS: 1 days | End: 2024-10-04
Payer: COMMERCIAL

## 2024-10-04 ENCOUNTER — RESULT REVIEW (OUTPATIENT)
Age: 44
End: 2024-10-04

## 2024-10-04 DIAGNOSIS — Z01.810 ENCOUNTER FOR PREPROCEDURAL CARDIOVASCULAR EXAMINATION: ICD-10-CM

## 2024-10-04 DIAGNOSIS — Z01.818 ENCOUNTER FOR OTHER PREPROCEDURAL EXAMINATION: ICD-10-CM

## 2024-10-04 PROCEDURE — 93306 TTE W/DOPPLER COMPLETE: CPT | Mod: 26

## 2024-10-04 PROCEDURE — 93351 STRESS TTE COMPLETE: CPT

## 2024-10-07 ENCOUNTER — APPOINTMENT (OUTPATIENT)
Dept: HEART AND VASCULAR | Facility: CLINIC | Age: 44
End: 2024-10-07
Payer: COMMERCIAL

## 2024-10-07 ENCOUNTER — NON-APPOINTMENT (OUTPATIENT)
Age: 44
End: 2024-10-07

## 2024-10-07 VITALS
SYSTOLIC BLOOD PRESSURE: 106 MMHG | HEART RATE: 76 BPM | WEIGHT: 133.13 LBS | OXYGEN SATURATION: 98 % | HEIGHT: 65 IN | DIASTOLIC BLOOD PRESSURE: 69 MMHG | BODY MASS INDEX: 22.18 KG/M2

## 2024-10-07 PROCEDURE — 93000 ELECTROCARDIOGRAM COMPLETE: CPT | Mod: NC

## 2024-10-07 PROCEDURE — 99214 OFFICE O/P EST MOD 30 MIN: CPT

## 2024-10-07 RX ORDER — PANTOPRAZOLE 40 MG/1
40 TABLET, DELAYED RELEASE ORAL TWICE DAILY
Refills: 0 | Status: ACTIVE | COMMUNITY

## 2024-10-07 RX ORDER — ENTECAVIR 0.5 MG/1
0.5 TABLET, FILM COATED ORAL DAILY
Refills: 0 | Status: ACTIVE | COMMUNITY

## 2024-10-16 ENCOUNTER — APPOINTMENT (OUTPATIENT)
Facility: CLINIC | Age: 44
End: 2024-10-16

## 2024-10-16 ENCOUNTER — APPOINTMENT (OUTPATIENT)
Dept: HEPATOLOGY | Facility: CLINIC | Age: 44
End: 2024-10-16

## 2024-10-16 ENCOUNTER — NON-APPOINTMENT (OUTPATIENT)
Age: 44
End: 2024-10-16

## 2024-10-16 VITALS
RESPIRATION RATE: 16 BRPM | HEIGHT: 65 IN | HEART RATE: 75 BPM | WEIGHT: 135 LBS | TEMPERATURE: 98.6 F | DIASTOLIC BLOOD PRESSURE: 81 MMHG | OXYGEN SATURATION: 99 % | SYSTOLIC BLOOD PRESSURE: 123 MMHG | BODY MASS INDEX: 22.49 KG/M2

## 2024-10-16 DIAGNOSIS — Z01.810 ENCOUNTER FOR PREPROCEDURAL CARDIOVASCULAR EXAMINATION: ICD-10-CM

## 2024-10-16 DIAGNOSIS — C22.0 LIVER CELL CARCINOMA: ICD-10-CM

## 2024-10-16 PROBLEM — F17.200 SMOKING: Status: ACTIVE | Noted: 2024-10-16

## 2024-10-16 PROCEDURE — 99204 OFFICE O/P NEW MOD 45 MIN: CPT

## 2024-10-16 PROCEDURE — G2211 COMPLEX E/M VISIT ADD ON: CPT | Mod: NC

## 2024-10-17 LAB
ABO + RH PNL BLD: NORMAL
ALBUMIN SERPL ELPH-MCNC: 4.4 G/DL
ALP BLD-CCNC: 109 U/L
ALT SERPL-CCNC: 22 U/L
ANION GAP SERPL CALC-SCNC: 10 MMOL/L
AST SERPL-CCNC: 28 U/L
BILIRUB SERPL-MCNC: 0.6 MG/DL
BUN SERPL-MCNC: 12 MG/DL
CALCIUM SERPL-MCNC: 9.7 MG/DL
CHLORIDE SERPL-SCNC: 103 MMOL/L
CO2 SERPL-SCNC: 28 MMOL/L
CREAT SERPL-MCNC: 0.98 MG/DL
EGFR: 99 ML/MIN/1.73M2
GLUCOSE SERPL-MCNC: 96 MG/DL
HCT VFR BLD CALC: 36.6 %
HGB BLD-MCNC: 11.1 G/DL
INR PPP: 0.98 RATIO
MCHC RBC-ENTMCNC: 23 PG
MCHC RBC-ENTMCNC: 30.3 GM/DL
MCV RBC AUTO: 75.9 FL
PLATELET # BLD AUTO: 253 K/UL
POTASSIUM SERPL-SCNC: 4 MMOL/L
PROT SERPL-MCNC: 8.1 G/DL
PT BLD: 11.6 SEC
RBC # BLD: 4.82 M/UL
RBC # FLD: 20.4 %
SODIUM SERPL-SCNC: 141 MMOL/L
WBC # FLD AUTO: 3.68 K/UL

## 2024-10-21 ENCOUNTER — APPOINTMENT (OUTPATIENT)
Dept: INFECTIOUS DISEASE | Facility: CLINIC | Age: 44
End: 2024-10-21
Payer: MEDICAID

## 2024-10-21 ENCOUNTER — NON-APPOINTMENT (OUTPATIENT)
Age: 44
End: 2024-10-21

## 2024-10-21 VITALS
HEIGHT: 65 IN | HEART RATE: 75 BPM | TEMPERATURE: 98.2 F | OXYGEN SATURATION: 98 % | WEIGHT: 135 LBS | SYSTOLIC BLOOD PRESSURE: 136 MMHG | BODY MASS INDEX: 22.49 KG/M2 | DIASTOLIC BLOOD PRESSURE: 86 MMHG

## 2024-10-21 DIAGNOSIS — Z85.05 PERSONAL HISTORY OF MALIGNANT NEOPLASM OF LIVER: ICD-10-CM

## 2024-10-21 DIAGNOSIS — F17.200 NICOTINE DEPENDENCE, UNSPECIFIED, UNCOMPLICATED: ICD-10-CM

## 2024-10-21 DIAGNOSIS — Z78.9 OTHER SPECIFIED HEALTH STATUS: ICD-10-CM

## 2024-10-21 DIAGNOSIS — Z87.11 PERSONAL HISTORY OF PEPTIC ULCER DISEASE: ICD-10-CM

## 2024-10-21 DIAGNOSIS — Z60.2 PROBLEMS RELATED TO LIVING ALONE: ICD-10-CM

## 2024-10-21 DIAGNOSIS — Z86.19 PERSONAL HISTORY OF OTHER INFECTIOUS AND PARASITIC DISEASES: ICD-10-CM

## 2024-10-21 DIAGNOSIS — Z63.5 DISRUPTION OF FAMILY BY SEPARATION AND DIVORCE: ICD-10-CM

## 2024-10-21 DIAGNOSIS — Z01.818 ENCOUNTER FOR OTHER PREPROCEDURAL EXAMINATION: ICD-10-CM

## 2024-10-21 DIAGNOSIS — B18.1 CHRONIC VIRAL HEPATITIS B W/OUT DELTA-AGENT: ICD-10-CM

## 2024-10-21 PROCEDURE — 99214 OFFICE O/P EST MOD 30 MIN: CPT

## 2024-10-21 RX ORDER — LENVATINIB 4 MG/1
CAPSULE ORAL
Refills: 0 | Status: ACTIVE | COMMUNITY

## 2024-10-21 SDOH — SOCIAL STABILITY - SOCIAL INSECURITY: PROBLEMS RELATED TO LIVING ALONE: Z60.2

## 2024-10-21 SDOH — SOCIAL STABILITY - SOCIAL INSECURITY: DISRUPTION OF FAMILY BY SEPARATION AND DIVORCE: Z63.5

## 2024-10-23 LAB
ACARBOXYPROTHROMBIN SERPL-MCNC: 0.2 NG/ML
ALPHA-1-FETOPROTEIN-L3: 18.9 %
ALPHA-1-FETOPROTEIN: 33.6 NG/ML
HBV DNA # SERPL NAA+PROBE: 34 IU/ML
HEPB DNA PCR INT: DETECTED
HEPB DNA PCR LOG: 1.53 LOGIU/ML
PETH 16:0/18:1: NEGATIVE NG/ML
PETH 16:0/18:2: NEGATIVE NG/ML
PETH COMMENTS: NORMAL

## 2024-11-13 ENCOUNTER — APPOINTMENT (OUTPATIENT)
Facility: CLINIC | Age: 44
End: 2024-11-13

## 2024-12-02 ENCOUNTER — APPOINTMENT (OUTPATIENT)
Dept: HEPATOLOGY | Facility: CLINIC | Age: 44
End: 2024-12-02
Payer: MEDICAID

## 2024-12-02 VITALS
SYSTOLIC BLOOD PRESSURE: 132 MMHG | TEMPERATURE: 97.5 F | OXYGEN SATURATION: 99 % | BODY MASS INDEX: 21.99 KG/M2 | WEIGHT: 132 LBS | HEART RATE: 79 BPM | HEIGHT: 65 IN | RESPIRATION RATE: 16 BRPM | DIASTOLIC BLOOD PRESSURE: 90 MMHG

## 2024-12-02 DIAGNOSIS — B18.1 CHRONIC VIRAL HEPATITIS B W/OUT DELTA-AGENT: ICD-10-CM

## 2024-12-02 DIAGNOSIS — C22.0 LIVER CELL CARCINOMA: ICD-10-CM

## 2024-12-02 DIAGNOSIS — Z01.818 ENCOUNTER FOR OTHER PREPROCEDURAL EXAMINATION: ICD-10-CM

## 2024-12-02 PROCEDURE — 99215 OFFICE O/P EST HI 40 MIN: CPT

## 2024-12-03 ENCOUNTER — NON-APPOINTMENT (OUTPATIENT)
Age: 44
End: 2024-12-03

## 2024-12-04 LAB
AFP-TM SERPL-MCNC: 4.4 NG/ML
ALBUMIN SERPL ELPH-MCNC: 4.3 G/DL
ALP BLD-CCNC: 102 U/L
ALT SERPL-CCNC: 27 U/L
ANION GAP SERPL CALC-SCNC: 13 MMOL/L
AST SERPL-CCNC: 24 U/L
BILIRUB SERPL-MCNC: 0.4 MG/DL
BUN SERPL-MCNC: 12 MG/DL
CALCIUM SERPL-MCNC: 9.3 MG/DL
CHLORIDE SERPL-SCNC: 101 MMOL/L
CO2 SERPL-SCNC: 27 MMOL/L
CREAT SERPL-MCNC: 0.89 MG/DL
EGFR: 110 ML/MIN/1.73M2
GLUCOSE SERPL-MCNC: 96 MG/DL
INR PPP: 0.98 RATIO
POTASSIUM SERPL-SCNC: 4 MMOL/L
PROT SERPL-MCNC: 8 G/DL
PT BLD: 11.6 SEC
SODIUM SERPL-SCNC: 141 MMOL/L

## 2024-12-09 LAB
ALPHA-1-FETOPROTEIN-L3: NORMAL %
ALPHA-1-FETOPROTEIN: 4.6 NG/ML
PETH 16:0/18:1: NEGATIVE NG/ML
PETH 16:0/18:2: NEGATIVE NG/ML
PETH COMMENTS: NORMAL

## 2024-12-11 ENCOUNTER — APPOINTMENT (OUTPATIENT)
Dept: PSYCHIATRY | Facility: CLINIC | Age: 44
End: 2024-12-11

## 2024-12-11 ENCOUNTER — OUTPATIENT (OUTPATIENT)
Dept: OUTPATIENT SERVICES | Facility: HOSPITAL | Age: 44
LOS: 1 days | End: 2024-12-11
Payer: COMMERCIAL

## 2024-12-11 DIAGNOSIS — F41.9 ANXIETY DISORDER, UNSPECIFIED: ICD-10-CM

## 2024-12-11 PROCEDURE — 90792 PSYCH DIAG EVAL W/MED SRVCS: CPT

## 2024-12-12 LAB — ACARBOXYPROTHROMBIN SERPL-MCNC: 0.1 NG/ML

## 2024-12-20 DIAGNOSIS — Z01.818 ENCOUNTER FOR OTHER PREPROCEDURAL EXAMINATION: ICD-10-CM

## 2024-12-20 DIAGNOSIS — Z71.41 ALCOHOL ABUSE COUNSELING AND SURVEILLANCE OF ALCOHOLIC: ICD-10-CM

## 2024-12-20 DIAGNOSIS — F17.200 NICOTINE DEPENDENCE, UNSPECIFIED, UNCOMPLICATED: ICD-10-CM

## 2024-12-31 ENCOUNTER — OUTPATIENT (OUTPATIENT)
Dept: OUTPATIENT SERVICES | Facility: HOSPITAL | Age: 44
LOS: 1 days | End: 2024-12-31

## 2024-12-31 ENCOUNTER — APPOINTMENT (OUTPATIENT)
Dept: MRI IMAGING | Facility: CLINIC | Age: 44
End: 2024-12-31
Payer: COMMERCIAL

## 2024-12-31 PROCEDURE — 74183 MRI ABD W/O CNTR FLWD CNTR: CPT | Mod: 26

## 2025-01-03 ENCOUNTER — NON-APPOINTMENT (OUTPATIENT)
Age: 45
End: 2025-01-03

## 2025-01-13 ENCOUNTER — APPOINTMENT (OUTPATIENT)
Dept: HEPATOLOGY | Facility: CLINIC | Age: 45
End: 2025-01-13
Payer: MEDICAID

## 2025-01-13 VITALS
WEIGHT: 130 LBS | HEART RATE: 82 BPM | HEIGHT: 65 IN | SYSTOLIC BLOOD PRESSURE: 125 MMHG | DIASTOLIC BLOOD PRESSURE: 83 MMHG | TEMPERATURE: 97.9 F | OXYGEN SATURATION: 100 % | RESPIRATION RATE: 16 BRPM | BODY MASS INDEX: 21.66 KG/M2

## 2025-01-13 DIAGNOSIS — Z01.810 ENCOUNTER FOR PREPROCEDURAL CARDIOVASCULAR EXAMINATION: ICD-10-CM

## 2025-01-13 DIAGNOSIS — K76.9 LIVER DISEASE, UNSPECIFIED: ICD-10-CM

## 2025-01-13 PROCEDURE — 99215 OFFICE O/P EST HI 40 MIN: CPT

## 2025-01-16 ENCOUNTER — NON-APPOINTMENT (OUTPATIENT)
Age: 45
End: 2025-01-16

## 2025-01-17 DIAGNOSIS — C22.0 LIVER CELL CARCINOMA: ICD-10-CM

## 2025-01-17 DIAGNOSIS — Z01.818 ENCOUNTER FOR OTHER PREPROCEDURAL EXAMINATION: ICD-10-CM

## 2025-01-17 DIAGNOSIS — B18.1 CHRONIC VIRAL HEPATITIS B W/OUT DELTA-AGENT: ICD-10-CM

## 2025-01-17 LAB
AFP-TM SERPL-MCNC: 3.2 NG/ML
ALBUMIN SERPL ELPH-MCNC: 4.1 G/DL
ALP BLD-CCNC: 98 U/L
ALT SERPL-CCNC: 31 U/L
ANION GAP SERPL CALC-SCNC: 11 MMOL/L
AST SERPL-CCNC: 32 U/L
BILIRUB SERPL-MCNC: 0.3 MG/DL
BUN SERPL-MCNC: 11 MG/DL
CALCIUM SERPL-MCNC: 8.8 MG/DL
CHLORIDE SERPL-SCNC: 105 MMOL/L
CO2 SERPL-SCNC: 24 MMOL/L
CREAT SERPL-MCNC: 0.88 MG/DL
EGFR: 110 ML/MIN/1.73M2
GLUCOSE SERPL-MCNC: 92 MG/DL
INR PPP: 0.96 RATIO
POTASSIUM SERPL-SCNC: 4.1 MMOL/L
PROT SERPL-MCNC: 7.8 G/DL
PT BLD: 11.3 SEC
SODIUM SERPL-SCNC: 140 MMOL/L

## 2025-01-19 LAB
PETH 16:0/18:1: NEGATIVE NG/ML
PETH 16:0/18:2: NEGATIVE NG/ML
PETH COMMENTS: NORMAL

## 2025-01-21 LAB — ACARBOXYPROTHROMBIN SERPL-MCNC: 0.1 NG/ML

## 2025-01-27 ENCOUNTER — NON-APPOINTMENT (OUTPATIENT)
Age: 45
End: 2025-01-27

## 2025-01-29 ENCOUNTER — APPOINTMENT (OUTPATIENT)
Dept: NUCLEAR MEDICINE | Facility: HOSPITAL | Age: 45
End: 2025-01-29
Payer: COMMERCIAL

## 2025-01-29 ENCOUNTER — NON-APPOINTMENT (OUTPATIENT)
Age: 45
End: 2025-01-29

## 2025-01-29 ENCOUNTER — OUTPATIENT (OUTPATIENT)
Dept: OUTPATIENT SERVICES | Facility: HOSPITAL | Age: 45
LOS: 1 days | End: 2025-01-29
Payer: COMMERCIAL

## 2025-01-29 PROCEDURE — 78306 BONE IMAGING WHOLE BODY: CPT | Mod: 26

## 2025-01-29 PROCEDURE — A9503: CPT

## 2025-01-29 PROCEDURE — 78306 BONE IMAGING WHOLE BODY: CPT

## 2025-02-24 ENCOUNTER — APPOINTMENT (OUTPATIENT)
Dept: HEPATOLOGY | Facility: CLINIC | Age: 45
End: 2025-02-24

## 2025-02-26 ENCOUNTER — APPOINTMENT (OUTPATIENT)
Dept: HEPATOLOGY | Facility: CLINIC | Age: 45
End: 2025-02-26
Payer: MEDICAID

## 2025-02-26 VITALS
HEART RATE: 79 BPM | BODY MASS INDEX: 21.49 KG/M2 | SYSTOLIC BLOOD PRESSURE: 127 MMHG | HEIGHT: 65 IN | OXYGEN SATURATION: 98 % | RESPIRATION RATE: 16 BRPM | DIASTOLIC BLOOD PRESSURE: 85 MMHG | TEMPERATURE: 98.2 F | WEIGHT: 129 LBS

## 2025-02-26 DIAGNOSIS — F17.200 NICOTINE DEPENDENCE, UNSPECIFIED, UNCOMPLICATED: ICD-10-CM

## 2025-02-26 DIAGNOSIS — B18.1 CHRONIC VIRAL HEPATITIS B W/OUT DELTA-AGENT: ICD-10-CM

## 2025-02-26 DIAGNOSIS — Z76.82 AWAITING ORGAN TRANSPLANT STATUS: ICD-10-CM

## 2025-02-26 DIAGNOSIS — K76.9 LIVER DISEASE, UNSPECIFIED: ICD-10-CM

## 2025-02-26 DIAGNOSIS — Z01.818 ENCOUNTER FOR OTHER PREPROCEDURAL EXAMINATION: ICD-10-CM

## 2025-02-26 DIAGNOSIS — C22.0 LIVER CELL CARCINOMA: ICD-10-CM

## 2025-02-26 LAB
ALBUMIN SERPL ELPH-MCNC: 4.2 G/DL
ALP BLD-CCNC: 87 U/L
ALT SERPL-CCNC: 32 U/L
ANION GAP SERPL CALC-SCNC: 9 MMOL/L
AST SERPL-CCNC: 31 U/L
BASOPHILS # BLD AUTO: 0.03 K/UL
BASOPHILS NFR BLD AUTO: 1 %
BILIRUB SERPL-MCNC: 0.5 MG/DL
BUN SERPL-MCNC: 13 MG/DL
CALCIUM SERPL-MCNC: 9.2 MG/DL
CHLORIDE SERPL-SCNC: 106 MMOL/L
CO2 SERPL-SCNC: 26 MMOL/L
CREAT SERPL-MCNC: 0.95 MG/DL
EGFR: 102 ML/MIN/1.73M2
EOSINOPHIL # BLD AUTO: 0.16 K/UL
EOSINOPHIL NFR BLD AUTO: 5.3 %
GLUCOSE SERPL-MCNC: 91 MG/DL
HCT VFR BLD CALC: 39 %
HGB BLD-MCNC: 12.5 G/DL
IMM GRANULOCYTES NFR BLD AUTO: 0.3 %
INR PPP: 0.95 RATIO
LYMPHOCYTES # BLD AUTO: 0.93 K/UL
LYMPHOCYTES NFR BLD AUTO: 30.7 %
MAN DIFF?: NORMAL
MCHC RBC-ENTMCNC: 26.8 PG
MCHC RBC-ENTMCNC: 32.1 G/DL
MCV RBC AUTO: 83.7 FL
MONOCYTES # BLD AUTO: 0.27 K/UL
MONOCYTES NFR BLD AUTO: 8.9 %
NEUTROPHILS # BLD AUTO: 1.63 K/UL
NEUTROPHILS NFR BLD AUTO: 53.8 %
PLATELET # BLD AUTO: 199 K/UL
POTASSIUM SERPL-SCNC: 4.3 MMOL/L
PROT SERPL-MCNC: 7.5 G/DL
PT BLD: 11.2 SEC
RBC # BLD: 4.66 M/UL
RBC # FLD: 19.9 %
SODIUM SERPL-SCNC: 141 MMOL/L
WBC # FLD AUTO: 3.03 K/UL

## 2025-02-26 PROCEDURE — G2211 COMPLEX E/M VISIT ADD ON: CPT | Mod: NC

## 2025-02-26 PROCEDURE — 99214 OFFICE O/P EST MOD 30 MIN: CPT

## 2025-02-27 LAB
HBV DNA # SERPL NAA+PROBE: 52 IU/ML
HEPB DNA PCR INT: DETECTED
HEPB DNA PCR LOG: 1.72 LOGIU/ML

## 2025-03-05 PROBLEM — Z01.818 PRE-TRANSPLANT EVALUATION FOR LIVER TRANSPLANT: Status: RESOLVED | Noted: 2024-09-04 | Resolved: 2025-03-05

## 2025-03-05 PROBLEM — F17.200 NICOTINE DEPENDENCE, UNCOMPLICATED: Status: ACTIVE | Noted: 2024-10-16

## 2025-03-05 PROBLEM — Z76.82 PRE-LIVER TRANSPLANT, LISTED: Status: ACTIVE | Noted: 2025-03-05

## 2025-03-05 PROBLEM — K76.9 LIVER LESION: Status: RESOLVED | Noted: 2024-09-04 | Resolved: 2025-03-05

## 2025-03-05 LAB
ACARBOXYPROTHROMBIN SERPL-MCNC: 0.1 NG/ML
ALPHA-1-FETOPROTEIN-L3: NORMAL %
ALPHA-1-FETOPROTEIN: 3.2 NG/ML
PETH 16:0/18:1: NEGATIVE NG/ML
PETH 16:0/18:2: NEGATIVE NG/ML
PETH COMMENTS: NORMAL

## 2025-03-12 ENCOUNTER — OUTPATIENT (OUTPATIENT)
Dept: OUTPATIENT SERVICES | Facility: HOSPITAL | Age: 45
LOS: 1 days | End: 2025-03-12
Payer: COMMERCIAL

## 2025-03-12 ENCOUNTER — APPOINTMENT (OUTPATIENT)
Dept: CT IMAGING | Facility: HOSPITAL | Age: 45
End: 2025-03-12

## 2025-03-12 PROCEDURE — 71250 CT THORAX DX C-: CPT

## 2025-03-12 PROCEDURE — 71250 CT THORAX DX C-: CPT | Mod: 26

## 2025-03-13 ENCOUNTER — NON-APPOINTMENT (OUTPATIENT)
Age: 45
End: 2025-03-13

## 2025-03-25 ENCOUNTER — APPOINTMENT (OUTPATIENT)
Dept: MRI IMAGING | Facility: CLINIC | Age: 45
End: 2025-03-25
Payer: COMMERCIAL

## 2025-03-25 ENCOUNTER — OUTPATIENT (OUTPATIENT)
Dept: OUTPATIENT SERVICES | Facility: HOSPITAL | Age: 45
LOS: 1 days | End: 2025-03-25

## 2025-03-25 PROCEDURE — 74183 MRI ABD W/O CNTR FLWD CNTR: CPT | Mod: 26

## 2025-04-07 ENCOUNTER — APPOINTMENT (OUTPATIENT)
Dept: HEPATOLOGY | Facility: CLINIC | Age: 45
End: 2025-04-07
Payer: MEDICAID

## 2025-04-07 VITALS
HEIGHT: 64.96 IN | DIASTOLIC BLOOD PRESSURE: 83 MMHG | OXYGEN SATURATION: 94 % | TEMPERATURE: 98.4 F | WEIGHT: 130 LBS | SYSTOLIC BLOOD PRESSURE: 119 MMHG | HEART RATE: 87 BPM | BODY MASS INDEX: 21.66 KG/M2 | RESPIRATION RATE: 16 BRPM

## 2025-04-07 DIAGNOSIS — B18.1 CHRONIC VIRAL HEPATITIS B W/OUT DELTA-AGENT: ICD-10-CM

## 2025-04-07 DIAGNOSIS — C22.0 LIVER CELL CARCINOMA: ICD-10-CM

## 2025-04-07 DIAGNOSIS — F17.200 NICOTINE DEPENDENCE, UNSPECIFIED, UNCOMPLICATED: ICD-10-CM

## 2025-04-07 PROCEDURE — 99215 OFFICE O/P EST HI 40 MIN: CPT

## 2025-04-09 LAB
ALBUMIN SERPL ELPH-MCNC: 4.4 G/DL
ALP BLD-CCNC: 90 U/L
ALT SERPL-CCNC: 23 U/L
ANION GAP SERPL CALC-SCNC: 13 MMOL/L
AST SERPL-CCNC: 25 U/L
BASOPHILS # BLD AUTO: 0.04 K/UL
BASOPHILS NFR BLD AUTO: 0.8 %
BILIRUB SERPL-MCNC: 0.5 MG/DL
BUN SERPL-MCNC: 11 MG/DL
CALCIUM SERPL-MCNC: 9.1 MG/DL
CHLORIDE SERPL-SCNC: 106 MMOL/L
CO2 SERPL-SCNC: 25 MMOL/L
CREAT SERPL-MCNC: 0.94 MG/DL
EGFRCR SERPLBLD CKD-EPI 2021: 104 ML/MIN/1.73M2
EOSINOPHIL # BLD AUTO: 0.32 K/UL
EOSINOPHIL NFR BLD AUTO: 6.2 %
GLUCOSE SERPL-MCNC: 97 MG/DL
HBV DNA # SERPL NAA+PROBE: 28 IU/ML
HCT VFR BLD CALC: 43.9 %
HEPB DNA PCR INT: DETECTED
HEPB DNA PCR LOG: 1.45 LOGIU/ML
HGB BLD-MCNC: 13.9 G/DL
IMM GRANULOCYTES NFR BLD AUTO: 0.2 %
LYMPHOCYTES # BLD AUTO: 1.5 K/UL
LYMPHOCYTES NFR BLD AUTO: 29.1 %
MAN DIFF?: NORMAL
MCHC RBC-ENTMCNC: 27.8 PG
MCHC RBC-ENTMCNC: 31.7 G/DL
MCV RBC AUTO: 87.8 FL
MONOCYTES # BLD AUTO: 0.41 K/UL
MONOCYTES NFR BLD AUTO: 8 %
NEUTROPHILS # BLD AUTO: 2.87 K/UL
NEUTROPHILS NFR BLD AUTO: 55.7 %
PLATELET # BLD AUTO: 205 K/UL
POTASSIUM SERPL-SCNC: 4.6 MMOL/L
PROT SERPL-MCNC: 7.9 G/DL
RBC # BLD: 5 M/UL
RBC # FLD: 19 %
SODIUM SERPL-SCNC: 144 MMOL/L
WBC # FLD AUTO: 5.15 K/UL

## 2025-04-12 LAB
ALPHA-1-FETOPROTEIN-L3: NORMAL %
ALPHA-1-FETOPROTEIN: 2.7 NG/ML

## 2025-04-17 LAB — ACARBOXYPROTHROMBIN SERPL-MCNC: 0.2 NG/ML

## 2025-06-30 ENCOUNTER — NON-APPOINTMENT (OUTPATIENT)
Age: 45
End: 2025-06-30

## 2025-07-07 ENCOUNTER — APPOINTMENT (OUTPATIENT)
Dept: HEPATOLOGY | Facility: CLINIC | Age: 45
End: 2025-07-07
Payer: MEDICAID

## 2025-07-07 VITALS
HEART RATE: 72 BPM | SYSTOLIC BLOOD PRESSURE: 123 MMHG | OXYGEN SATURATION: 99 % | TEMPERATURE: 97.2 F | BODY MASS INDEX: 21.17 KG/M2 | DIASTOLIC BLOOD PRESSURE: 83 MMHG | RESPIRATION RATE: 16 BRPM | HEIGHT: 64 IN | WEIGHT: 124 LBS

## 2025-07-07 PROCEDURE — G2211 COMPLEX E/M VISIT ADD ON: CPT | Mod: NC

## 2025-07-07 PROCEDURE — 99214 OFFICE O/P EST MOD 30 MIN: CPT

## 2025-07-09 LAB
ALBUMIN SERPL ELPH-MCNC: 4.5 G/DL
ALP BLD-CCNC: 96 U/L
ALT SERPL-CCNC: 27 U/L
ANION GAP SERPL CALC-SCNC: 13 MMOL/L
AST SERPL-CCNC: 31 U/L
BASOPHILS # BLD AUTO: 0.06 K/UL
BASOPHILS NFR BLD AUTO: 1.3 %
BILIRUB SERPL-MCNC: 0.5 MG/DL
BUN SERPL-MCNC: 15 MG/DL
CALCIUM SERPL-MCNC: 9.8 MG/DL
CHLORIDE SERPL-SCNC: 103 MMOL/L
CO2 SERPL-SCNC: 24 MMOL/L
CREAT SERPL-MCNC: 0.92 MG/DL
EGFRCR SERPLBLD CKD-EPI 2021: 106 ML/MIN/1.73M2
EOSINOPHIL # BLD AUTO: 0.18 K/UL
EOSINOPHIL NFR BLD AUTO: 3.9 %
GLUCOSE SERPL-MCNC: 83 MG/DL
HBV DNA # SERPL NAA+PROBE: NOT DETECTED IU/ML
HCT VFR BLD CALC: 45.4 %
HEPB DNA PCR INT: NOT DETECTED
HEPB DNA PCR LOG: NOT DETECTED LOGIU/ML
HGB BLD-MCNC: 15 G/DL
IMM GRANULOCYTES NFR BLD AUTO: 0.2 %
INR PPP: 1.02 RATIO
LYMPHOCYTES # BLD AUTO: 0.99 K/UL
LYMPHOCYTES NFR BLD AUTO: 21.5 %
MAN DIFF?: NORMAL
MCHC RBC-ENTMCNC: 30.7 PG
MCHC RBC-ENTMCNC: 33 G/DL
MCV RBC AUTO: 92.8 FL
MONOCYTES # BLD AUTO: 0.44 K/UL
MONOCYTES NFR BLD AUTO: 9.6 %
NEUTROPHILS # BLD AUTO: 2.92 K/UL
NEUTROPHILS NFR BLD AUTO: 63.5 %
PLATELET # BLD AUTO: 210 K/UL
POTASSIUM SERPL-SCNC: 4.6 MMOL/L
PROT SERPL-MCNC: 8.1 G/DL
PT BLD: 12.1 SEC
RBC # BLD: 4.89 M/UL
RBC # FLD: 15.8 %
SODIUM SERPL-SCNC: 140 MMOL/L
WBC # FLD AUTO: 4.6 K/UL

## 2025-07-12 ENCOUNTER — APPOINTMENT (OUTPATIENT)
Dept: MRI IMAGING | Facility: CLINIC | Age: 45
End: 2025-07-12
Payer: COMMERCIAL

## 2025-07-12 ENCOUNTER — OUTPATIENT (OUTPATIENT)
Dept: OUTPATIENT SERVICES | Facility: HOSPITAL | Age: 45
LOS: 1 days | End: 2025-07-12

## 2025-07-12 ENCOUNTER — RESULT REVIEW (OUTPATIENT)
Age: 45
End: 2025-07-12

## 2025-07-12 PROCEDURE — 74183 MRI ABD W/O CNTR FLWD CNTR: CPT | Mod: 26

## 2025-07-14 LAB
ALPHA-1-FETOPROTEIN-L3: NORMAL %
ALPHA-1-FETOPROTEIN: 3 NG/ML

## 2025-07-20 LAB — ACARBOXYPROTHROMBIN SERPL-MCNC: 0.2 NG/ML

## 2025-08-05 DIAGNOSIS — C22.0 LIVER CELL CARCINOMA: ICD-10-CM

## 2025-08-15 ENCOUNTER — RESULT REVIEW (OUTPATIENT)
Age: 45
End: 2025-08-15

## 2025-08-15 ENCOUNTER — OUTPATIENT (OUTPATIENT)
Dept: OUTPATIENT SERVICES | Facility: HOSPITAL | Age: 45
LOS: 1 days | End: 2025-08-15

## 2025-08-15 ENCOUNTER — APPOINTMENT (OUTPATIENT)
Dept: CT IMAGING | Facility: CLINIC | Age: 45
End: 2025-08-15
Payer: COMMERCIAL

## 2025-08-15 PROCEDURE — 71250 CT THORAX DX C-: CPT | Mod: 26

## 2025-08-18 ENCOUNTER — NON-APPOINTMENT (OUTPATIENT)
Age: 45
End: 2025-08-18

## 2025-08-20 ENCOUNTER — NON-APPOINTMENT (OUTPATIENT)
Age: 45
End: 2025-08-20

## 2025-08-22 ENCOUNTER — NON-APPOINTMENT (OUTPATIENT)
Age: 45
End: 2025-08-22

## 2025-09-03 ENCOUNTER — APPOINTMENT (OUTPATIENT)
Dept: PULMONOLOGY | Facility: CLINIC | Age: 45
End: 2025-09-03

## 2025-09-03 VITALS
DIASTOLIC BLOOD PRESSURE: 92 MMHG | OXYGEN SATURATION: 98 % | TEMPERATURE: 97.2 F | SYSTOLIC BLOOD PRESSURE: 143 MMHG | RESPIRATION RATE: 12 BRPM | BODY MASS INDEX: 21.17 KG/M2 | HEART RATE: 67 BPM | WEIGHT: 124 LBS | HEIGHT: 64 IN

## 2025-09-03 DIAGNOSIS — R91.1 SOLITARY PULMONARY NODULE: ICD-10-CM

## 2025-09-03 PROCEDURE — 99205 OFFICE O/P NEW HI 60 MIN: CPT

## 2025-09-05 ENCOUNTER — NON-APPOINTMENT (OUTPATIENT)
Age: 45
End: 2025-09-05

## 2025-09-05 LAB
1. LITTLE INTEREST OR PLEASURE IN DOING THINGS: 0
2. FEELING DOWN, DEPRESSED, OR HOPELESS: 0
PHQ-2 ADULT DEPRESSION SCORE: 0

## 2025-09-10 DIAGNOSIS — C22.0 LIVER CELL CARCINOMA: ICD-10-CM

## 2025-09-11 ENCOUNTER — NON-APPOINTMENT (OUTPATIENT)
Age: 45
End: 2025-09-11